# Patient Record
Sex: FEMALE | Race: WHITE | Employment: UNEMPLOYED | URBAN - METROPOLITAN AREA
[De-identification: names, ages, dates, MRNs, and addresses within clinical notes are randomized per-mention and may not be internally consistent; named-entity substitution may affect disease eponyms.]

---

## 2018-07-03 ENCOUNTER — APPOINTMENT (OUTPATIENT)
Dept: GENERAL RADIOLOGY | Facility: CLINIC | Age: 23
End: 2018-07-03
Attending: EMERGENCY MEDICINE

## 2018-07-03 ENCOUNTER — HOSPITAL ENCOUNTER (EMERGENCY)
Facility: CLINIC | Age: 23
Discharge: HOME | End: 2018-07-03
Attending: EMERGENCY MEDICINE | Admitting: EMERGENCY MEDICINE

## 2018-07-03 VITALS
HEART RATE: 77 BPM | OXYGEN SATURATION: 99 % | WEIGHT: 155.13 LBS | RESPIRATION RATE: 16 BRPM | DIASTOLIC BLOOD PRESSURE: 77 MMHG | BODY MASS INDEX: 23.59 KG/M2 | TEMPERATURE: 98.2 F | SYSTOLIC BLOOD PRESSURE: 110 MMHG

## 2018-07-03 DIAGNOSIS — S20.229A CONTUSION OF BACK WALL OF THORAX, INITIAL ENCOUNTER: ICD-10-CM
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
## 2018-07-03 PROCEDURE — 71046 X-RAY EXAM CHEST 2 VIEWS: CPT

## 2018-07-03 PROCEDURE — 99283 EMERGENCY DEPT VISIT LOW MDM: CPT | Mod: Z6 | Performed by: EMERGENCY MEDICINE

## 2018-07-03 PROCEDURE — 72072 X-RAY EXAM THORAC SPINE 3VWS: CPT

## 2018-07-03 PROCEDURE — 99283 EMERGENCY DEPT VISIT LOW MDM: CPT | Performed by: EMERGENCY MEDICINE

## 2018-07-03 RX ORDER — NAPROXEN 500 MG/1
500 TABLET ORAL 2 TIMES DAILY WITH MEALS
Qty: 24 TABLET | Refills: 0 | Status: SHIPPED | OUTPATIENT
Start: 2018-07-03

## 2018-07-03 ASSESSMENT — ENCOUNTER SYMPTOMS
NUMBNESS: 0
FEVER: 0
COUGH: 0
DIARRHEA: 0
NAUSEA: 0
SHORTNESS OF BREATH: 0
VOMITING: 0
WEAKNESS: 0
BACK PAIN: 1
CHILLS: 0
ABDOMINAL PAIN: 0

## 2018-07-03 NOTE — ED AVS SNAPSHOT
[facility], [place], Emergency Department    [address]    [facility] [address]    Phone:  [phone]    Fax:  [phone]                                       Hiwot Emmanuel   MRN: [id number]    Department:  [facility], Emergency Department   Date of Visit:  7/3/2018           After Visit Summary Signature Page     I have received my discharge instructions, and my questions have been answered. I have discussed any challenges I see with this plan with the nurse or doctor.    ..........................................................................................................................................  Patient/Patient Representative Signature      ..........................................................................................................................................  Patient Representative Print Name and Relationship to Patient    ..................................................               ................................................  Date                                            Time    ..........................................................................................................................................  Reviewed by Signature/Title    ...................................................              ..............................................  Date                                                            Time

## 2018-07-03 NOTE — ED PROVIDER NOTES
"  History     Chief Complaint   Patient presents with     Back Pain     reports tripped and fell bckwards into a book case. c/o upper back pain, no LOC     HPI  Hiwot Emmanuel is a 23 year old female who presents for evaluation of back pain. Patient reports she was standing on her bed this morning when she fell backwards off the bed hitting her upper back against the edge of a bookshelf. She denies neck injury, head injury, or loss of consciousness during the fall. Currently, she complains of left-sided upper back pain, worse when she lifts her left arm above her head. She also has pain described as \"it feels like someone stabbing me in the back\" when she takes a deep breath. She denies numbness or weakness in the extremities, and has had no difficulty ambulating since the fall. She has not had any recent fever, cough, chest pain, shortness of breath, abdominal pain, nausea or vomiting. She took 800 mg of ibuprofen for her pain prior to arrival without improvement.     I have reviewed the Medications, Allergies, Past Medical and Surgical History, and Social History in the [facility] system.  History reviewed. No pertinent past medical history.    History reviewed. No pertinent surgical history.    Family History   Problem Relation Age of Onset     Cerebrovascular Disease Paternal Grandfather        Social History   Substance Use Topics     Smoking status: Never Smoker     Smokeless tobacco: Never Used     Alcohol use No       No current facility-administered medications for this encounter.      Current Outpatient Prescriptions   Medication     Cyanocobalamin (VITAMIN B 12 PO)     Ferrous Sulfate (IRON SUPPLEMENT PO)     naproxen (NAPROSYN) 500 MG tablet     Omega-3 Fatty Acids (FISH OIL OMEGA-3 PO)      No Known Allergies    Review of Systems   Constitutional: Negative for chills and fever.   Respiratory: Negative for cough and shortness of breath.    Cardiovascular: Negative for chest pain.   Gastrointestinal: Negative for " abdominal pain, diarrhea, nausea and vomiting.   Musculoskeletal: Positive for back pain (left upper). Negative for gait problem.   Neurological: Negative for weakness and numbness.   All other systems reviewed and are negative.      Physical Exam   BP: 106/83  Pulse: 77  Temp: 98.2  F (36.8  C)  Resp: 16  Weight: 70.4 kg (155 lb 2 oz)  SpO2: 100 %      Physical Exam  GEN:  Well developed, no acute distress  HEENT:  PERRL, EOMI, Mucous membranes are moist.   Cardio:  RRR, no murmur, radial pulses equal bilaterally  PULM:  Lungs clear, good air movement, no wheezes, rales  Abd:  Soft, normal bowel sounds, no focal tenderness  Musculoskeletal: No C-spine, T-spine, L-spine tenderness, all 4 extremities have normal range of motion, no lower extremity swelling or calf tenderness.  There is no ecchymosis or erythema on the back.  There is mild paraspinal tenderness to the left of the thoracic spine between the spine and the edge of the left scapula.  No tenderness over the scapula or along the edge of the scapula.  Neuro:  Alert and oriented X3, Follows commands, sensation and strength is normal throughout all 4 extremities, patellar reflexes are normal   Skin:  Warm, dry    ED Course     ED Course     Procedures       11:20 AM  The patient was seen and examined by Dr. Maldonado in Room 6.       Critical Care time:  none    X-rays were done of the T-spine and chest.  Since being reports back pain with deep breaths, I considered possible rib fracture or contusion.   Results for orders placed or performed during the hospital encounter of 07/03/18   XR Thoracic Spine 3 Views    Narrative    XR THORACIC SPINE 3 VW 7/3/2018 11:55 AM    COMPARISON: None.    HISTORY: Back trauma.      Impression    IMPRESSION: Convex right curvature in the thoracic spine measuring 22  degrees spanning superior endplate of T6 through the inferior endplate  of T10. Vertebral heights and disc spaces are preserved without  evidence of fracture. No  significant listhesis identified.    MICHELE CARTY MD   XR Chest 2 Views    Narrative    XR CHEST 2 VW 7/3/2018 11:55 AM    COMPARISON: None.    HISTORY: Back pain, possible posterior rib injury.      Impression    IMPRESSION: Cardiac silhouette and pulmonary vasculature are within  normal limits. No focal airspace disease, pleural effusion or  pneumothorax.    MICHELE CARTY MD     I reviewed the x-ray images and report with the patient.  She is aware of the mild scoliosis seen on the x-rays.  I advised her that there is nothing that needs to be done about the scoliosis at this time and it is unlikely to be due from the fall today.  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      Labs Ordered and Resulted from Time of ED Arrival Up to the Time of Departure from the ED - No data to display         Assessments & Plan (with Medical Decision Making)   Patient presents with pain in the upper back after a fall.  There is no sign of fracture and there are no neurologic deficits.  There is no head injury or neck injury.  I suspect that her pain is from my contusion on her back.  I did not recommend any activity restrictions.  She was given a prescription for naproxen and was advised that she can take that with Tylenol as well.  She was advised to follow-up with her primary care physician or team physician for this.    I have reviewed the nursing notes.    I have reviewed the findings, diagnosis, plan and need for follow up with the patient.    New Prescriptions    NAPROXEN (NAPROSYN) 500 MG TABLET    Take 1 tablet (500 mg) by mouth 2 times daily (with meals)       Final diagnoses:   Fall, initial encounter   Contusion of back wall of thorax, initial encounter   Maryan WARE, am serving as a trained medical scribe to document services personally performed by Helen Maldonado MD, based on the provider's  statements to me.   I, Helen Maldonado MD, was physically present and have reviewed and verified the accuracy of this note documented by Maryan Suh.      7/3/2018   [facility], [place], EMERGENCY DEPARTMENT     Shima Maldonado MD  07/03/18 1264

## 2018-07-03 NOTE — DISCHARGE INSTRUCTIONS
Back Contusion  You have a contusion to your back. A contusion is also called a bruise. There is swelling and some bleeding under the skin. The skin may be purplish. You may have muscle aching and stiffness in the area of the bruise. There are no broken bones.  Contusions heal on their own, without further treatment. However, pain and skin discoloration may take weeks to months to go away.   Home care    Rest. Avoid heavy lifting, strenuous exertion, or any activity that causes pain.    Ice the area to reduce pain and swelling. Put ice cubes in a plastic bag or use a cold pack. (Wrap the cold source in a thin towel. Don't place it directly on your skin.) Ice the injured area for 20 minutes every 1 to 2 hours the first day. Continue with ice packs 3 to 4 times a day for the next 2 days, then as needed for the relief of pain and swelling.    Take any prescribed pain medicine. If none was prescribed, take acetaminophen, ibuprofen, or naproxen to control pain, unless you have other medical conditions that prevent taking these medicines. If you are unsure about medicines, ask your healthcare provider before you leave the hospital.  Follow-up care  Follow up with your healthcare provider, or as directed. Call if you are not better in 1 to 2 weeks.  When to seek medical advice  Call your healthcare provider for any of the following:    New or worsening pain    Increased swelling around the bruise    Pain spreads to one or both legs    Weakness or numbness in one or both legs     Loss of bowel or bladder control    Numbness in the groin or genital area    Fever of 100.4 F (38 C) or higher, or as directed by your healthcare provider  Date Last Reviewed: 7/1/2017 2000-2017 [employer]. [address] [place] [id number]. All rights reserved. This information is not intended as a substitute for professional medical care. Always follow your healthcare professional's instructions.      The x-ray of your  thoracic spine shows scoliosis measured at 22 .  This is unlikely to require treatment.    Please make an appointment to follow up with Your Primary Care Provider in 7-14 days for further management of your scoliosis. See your doctor sooner or return to the Emergency Department if you have new or worsening symptoms.

## 2018-07-03 NOTE — ED AVS SNAPSHOT
[facility], Emergency Department    [address]    [place] [address]    Phone:  [phone]    Fax:  [phone]                                       Hiwot Emmanuel   MRN: [id number]    Department:  [facility], Emergency Department   Date of Visit:  7/3/2018           Patient Information     Date Of Birth          1995        Your diagnoses for this visit were:     Fall, initial encounter     Contusion of back wall of thorax, initial encounter        You were seen by Shima Maldonado MD.        Discharge Instructions         Back Contusion  You have a contusion to your back. A contusion is also called a bruise. There is swelling and some bleeding under the skin. The skin may be purplish. You may have muscle aching and stiffness in the area of the bruise. There are no broken bones.  Contusions heal on their own, without further treatment. However, pain and skin discoloration may take weeks to months to go away.   Home care    Rest. Avoid heavy lifting, strenuous exertion, or any activity that causes pain.    Ice the area to reduce pain and swelling. Put ice cubes in a plastic bag or use a cold pack. (Wrap the cold source in a thin towel. Don't place it directly on your skin.) Ice the injured area for 20 minutes every 1 to 2 hours the first day. Continue with ice packs 3 to 4 times a day for the next 2 days, then as needed for the relief of pain and swelling.    Take any prescribed pain medicine. If none was prescribed, take acetaminophen, ibuprofen, or naproxen to control pain, unless you have other medical conditions that prevent taking these medicines. If you are unsure about medicines, ask your healthcare provider before you leave the hospital.  Follow-up care  Follow up with your healthcare provider, or as directed. Call if you are not better in 1 to 2 weeks.  When to seek medical advice  Call your healthcare provider for any of the following:    New or worsening pain    Increased  swelling around the bruise    Pain spreads to one or both legs    Weakness or numbness in one or both legs     Loss of bowel or bladder control    Numbness in the groin or genital area    Fever of 100.4 F (38 C) or higher, or as directed by your healthcare provider  Date Last Reviewed: 7/1/2017 2000-2017 [employer]. [address] [place] [address]. All rights reserved. This information is not intended as a substitute for professional medical care. Always follow your healthcare professional's instructions.      The x-ray of your thoracic spine shows scoliosis measured at 22 .  This is unlikely to require treatment.    Please make an appointment to follow up with Your Primary Care Provider in 7-14 days for further management of your scoliosis. See your doctor sooner or return to the Emergency Department if you have new or worsening symptoms.       24 Hour Appointment Hotline       To make an appointment at any [facility], call [phone] ([phone]). If you don't have a family doctor or clinic, we will help you find one. [facility] clinics are conveniently located to serve the needs of you and your family.             Review of your medicines      START taking        Dose / Directions Last dose taken    naproxen 500 MG tablet   Commonly known as:  NAPROSYN   Dose:  500 mg   Quantity:  24 tablet        Take 1 tablet (500 mg) by mouth 2 times daily (with meals)   Refills:  0          Our records show that you are taking the medicines listed below. If these are incorrect, please call your family doctor or clinic.        Dose / Directions Last dose taken    FISH OIL OMEGA-3 PO        Refills:  0        IRON SUPPLEMENT PO        Refills:  0        VITAMIN B 12 PO        Refills:  0                Prescriptions were sent or printed at these locations (1 Prescription)                   Other Prescriptions                Printed at Department/Unit printer (1 of 1)         naproxen  "(NAPROSYN) 500 MG tablet                Procedures and tests performed during your visit     XR Chest 2 Views    XR Thoracic Spine 3 Views      Orders Needing Specimen Collection     None      Pending Results     No orders found from 2018 to 2018.            Pending Culture Results     No orders found from 2018 to 2018.            Pending Results Instructions     If you had any lab results that were not finalized at the time of your Discharge, you can call the ED Lab Result RN at [phone]. You will be contacted by this team for any positive Lab results or changes in treatment. The nurses are available 7 days a week from 10A to 6:30P.  You can leave a message 24 hours per day and they will return your call.        Thank you for choosing [facility]       Thank you for choosing [facility] for your care. Our goal is always to provide you with excellent care. Hearing back from our patients is one way we can continue to improve our services. Please take a few minutes to complete the written survey that you may receive in the mail after you visit with us. Thank you!        [employer] Information     [employer] lets you send messages to your doctor, view your test results, renew your prescriptions, schedule appointments and more. To sign up, go to [email or web address] . Click on \"Log in\" on the left side of the screen, which will take you to the Welcome page. Then click on \"Sign up Now\" on the right side of the page.     You will be asked to enter the access code listed below, as well as some personal information. Please follow the directions to create your username and password.     Your access code is: [email or web address]  Expires: 10/1/2018 12:58 PM     Your access code will  in 90 days. If you need help or a new code, please call your [facility] clinic or [phone].        Care EveryWhere ID     This is your Care EveryWhere ID. This could be used by other organizations to access your [facility] medical " records  [id number]        Equal Access to Services     ALEX SANTILLAN : [facility], nash hollingsworth, denae montero. [facility] [phone].    ATENCIÓN: Si habla español, tiene a garrison disposición servicios gratuitos de asistencia lingüística. Llame al [phone].    We comply with applicable federal civil rights laws and [place] laws. We do not discriminate on the basis of race, color, national origin, age, disability, sex, sexual orientation, or gender identity.            After Visit Summary       This is your record. Keep this with you and show to your community pharmacist(s) and doctor(s) at your next visit.